# Patient Record
Sex: FEMALE | Race: BLACK OR AFRICAN AMERICAN | URBAN - METROPOLITAN AREA
[De-identification: names, ages, dates, MRNs, and addresses within clinical notes are randomized per-mention and may not be internally consistent; named-entity substitution may affect disease eponyms.]

---

## 2021-06-18 ENCOUNTER — OFFICE VISIT (OUTPATIENT)
Dept: ENT CLINIC | Age: 61
End: 2021-06-18
Payer: COMMERCIAL

## 2021-06-18 VITALS
HEIGHT: 63 IN | SYSTOLIC BLOOD PRESSURE: 122 MMHG | RESPIRATION RATE: 16 BRPM | DIASTOLIC BLOOD PRESSURE: 78 MMHG | WEIGHT: 138 LBS | HEART RATE: 69 BPM | BODY MASS INDEX: 24.45 KG/M2 | OXYGEN SATURATION: 99 % | TEMPERATURE: 97.1 F

## 2021-06-18 DIAGNOSIS — R09.82 POST-NASAL DRAINAGE: ICD-10-CM

## 2021-06-18 DIAGNOSIS — R09.81 NASAL CONGESTION: Primary | ICD-10-CM

## 2021-06-18 DIAGNOSIS — J34.3 NASAL TURBINATE HYPERTROPHY: ICD-10-CM

## 2021-06-18 PROCEDURE — 99203 OFFICE O/P NEW LOW 30 MIN: CPT | Performed by: OTOLARYNGOLOGY

## 2021-06-18 RX ORDER — LEVOCETIRIZINE DIHYDROCHLORIDE 5 MG/1
5 TABLET, FILM COATED ORAL DAILY
Qty: 30 TABLET | Refills: 1 | Status: SHIPPED | OUTPATIENT
Start: 2021-06-18 | End: 2021-08-18

## 2021-06-18 NOTE — PROGRESS NOTES
1. Have you been to the ER, urgent care clinic since your last visit? Hospitalized since your last visit? No    2. Have you seen or consulted any other health care providers outside of the [address] [place] [address] since your last visit? Include any pap smears or colon screening.  No    Chief Complaint   Patient presents with    New Patient     c/o congestion that seems to be getting worse, scratchy/tickle in throat, and sometimes there's blood with sneezing     Visit Vitals  /78 (BP 1 Location: Left upper arm, BP Patient Position: Sitting, BP Cuff Size: Adult)   Pulse 69   Temp 97.1 °F (36.2 °C) (Temporal)   Resp 16   Ht 5' 3\" (1.6 m)   Wt 138 lb (62.6 kg)   SpO2 99%   BMI 24.45 kg/m²

## 2021-06-18 NOTE — LETTER
6/21/2021 Patient: Hitesh Myles  
YOB: 1960 Date of Visit: 6/18/2021 Franci Brothers MD 
[address] [id number] Via Fax: [phone] Dear Franci Brothers MD, Thank you for referring Ms. Sienna Tellez to [facility] [address], THROAT AND ALLERGY CARE for evaluation. My notes for this consultation are attached. If you have questions, please do not hesitate to call me. I look forward to following your patient along with you. Sincerely, Estefania Goetz MD

## 2021-06-18 NOTE — PROGRESS NOTES
Otolaryngology-Head and Neck Surgery  New Patient Visit     Patient: Leonie Flores  YOB: 1960  MRN: [id number]  Date of Service: 6/18/2021    Chief Complaint:  Allergies     History of Present Illness: Leonie Flores is a 64y.o. year old female who presents today for discussion of suspected allergies. Describes a history of chronic nasal congestion, post nasal drip and scratchy throat. Seems to be gradually getting worse and more bothersome. Would rather avoid medication if able     Has tried nasal sprays and antihistamines in the past with variable success    No prior allergy testing      Past Medical History:  Past Medical History:   Diagnosis Date    Hypertension        Past Surgical History:   History reviewed. No pertinent surgical history. Medications:   Denies     Allergies:   No Known Allergies    Social History:   Social History     Tobacco Use    Smoking status: Never Smoker    Smokeless tobacco: Never Used   Vaping Use    Vaping Use: Never used   Substance Use Topics    Alcohol use: Not Currently    Drug use: Never      Pt known to me - mom also pt of mine    Family History:  Family History   Problem Relation Age of Onset    Hearing Impairment Mother        Review of Systems:    Consitutional: denies fever, excessive weight gain or loss. Eyes: denies diplopia, eye pain. Integumentary: denies new concerning skin lesions. Ears, Nose, Mouth, Throat: denies except as per HPI.   Endocrine: denies hot or cold intolerance, increased thirst.  Respiratory: denies cough, hemoptysis, wheezing  Gastrointestinal: denies trouble swallowing, nausea, emesis, regurgitation  Musculoskeletal: denies muscle weakness or wasting  Cardiovascular: denies chest pain, shortness of breath  Neurologic: denies seizures, numbness or tingling, syncope  Hematologic: denies easy bleeding or bruising    Physical Examination:   Vitals:    06/18/21 1427   BP: 122/78   BP 1 Location: Left upper arm   BP Patient Position: Sitting   BP Cuff Size: Adult   Pulse: 69   Temp: 97.1 °F (36.2 °C)   TempSrc: Temporal   Resp: 16   Height: 5' 3\" (1.6 m)   Weight: 138 lb (62.6 kg)   SpO2: 99%        General: Comfortable, pleasant, appears stated age  Voice: Strong, speaking in full sentences, no stridor    Face: No masses or lesions, facial strength symmetric   Ears: External ears unremarkable. Bilateral ear canal clear. Tympanic membrane clear and intact, with visible landmarks. Clear middle ear space  Nose: External nose unremarkable. Dorsum midline. Anterior rhinoscopy demonstrates no lesions. Septum midline. Diffuse large inferior turbinates making contact with septum. Oral Cavity / Oropharynx: No trismus. Mucosa pink and moist. No lesions. Tongue is midline and mobile. Palate elevates symmetrically. Uvula midline. Tonsils unremarkable. Base of tongue soft. Floor of mouth soft. Neck: Supple. No adenopathy. Thyroid unremarkable. Palpable laryngeal landmarks. Full neck range of motion   Neurologic: CN II - XI intact. Normal gait      Assessment and Plan:   1. Nasal congestion  2. Inferior turbinate hypertrophy  3. Post nasal drip   - Agree history consistent with allergies  - Discussed management options including conservative and more involved  - Discussed role of nasal saline spray, irrigations, steam, humidification  - Discussed role of medications  - If not well controlled then sometimes role for procedures (turbinate reduction) and/or allergy testing/immunotherapy  - She'd like to start with consistent oral antihistamine for now and see what progress we make  - Follow up in 6 weeks  - Will plan nasal endoscopy NOV if not improved and can discuss alternate options accordingly          The patient was instructed to return to clinic if no improvement or progression of symptoms. Signs to watch out for reviewed.       MD Rosario Mcnulty [facility]  [address] [place] [address] 6  [address] [place]  Office Phone: [phone]

## 2021-08-18 RX ORDER — LEVOCETIRIZINE DIHYDROCHLORIDE 5 MG/1
5 TABLET, FILM COATED ORAL DAILY
Qty: 30 TABLET | Refills: 1 | Status: SHIPPED | OUTPATIENT
Start: 2021-08-18 | End: 2021-10-15

## 2021-10-15 RX ORDER — LEVOCETIRIZINE DIHYDROCHLORIDE 5 MG/1
5 TABLET, FILM COATED ORAL DAILY
Qty: 30 TABLET | Refills: 1 | Status: SHIPPED | OUTPATIENT
Start: 2021-10-15

## 2023-05-11 RX ORDER — LEVOCETIRIZINE DIHYDROCHLORIDE 5 MG/1
1 TABLET, FILM COATED ORAL DAILY
COMMUNITY
Start: 2021-10-15